# Patient Record
Sex: FEMALE | Race: WHITE | Employment: FULL TIME | URBAN - METROPOLITAN AREA
[De-identification: names, ages, dates, MRNs, and addresses within clinical notes are randomized per-mention and may not be internally consistent; named-entity substitution may affect disease eponyms.]

---

## 2017-10-25 PROCEDURE — 88305 TISSUE EXAM BY PATHOLOGIST: CPT | Performed by: INTERNAL MEDICINE

## 2018-04-04 ENCOUNTER — OFFICE VISIT (OUTPATIENT)
Dept: PHYSICAL THERAPY | Age: 68
End: 2018-04-04
Attending: FAMILY MEDICINE
Payer: MEDICARE

## 2018-04-04 PROCEDURE — 97161 PT EVAL LOW COMPLEX 20 MIN: CPT

## 2018-04-04 PROCEDURE — 97110 THERAPEUTIC EXERCISES: CPT

## 2018-04-04 NOTE — PROGRESS NOTES
UPPER EXTREMITY EVALUATION:   Referring Physician: Kimberly Lazaro MD  Referring Diagnosis: Chronic left shoulder pain (M25.512)    Date of Service: 4/4/2018     PATIENT SUMMARY   Lamberto Bradford is a 79year old female who presents to therapy today with comp FINGER SURGERY   • Other surgical history  10/22/13    left knee a pmm   • Tonsillectomy     • Tubal ligation       Red Flag Questions: No nausea, nystagmus, dysphagia, diplopia, dysarthria, dizziness, drop attack      Physician Appointment: schedule as ne instructed in and issued a HEP for: thoracic extension in sitting, thoracic extension on foam roller  Charges: PT Eval Low Complexity, TherEx x1 (15 min)      Total Timed Treatment: 15 min     Total Treatment Time: 45 min     PLAN OF CARE:    Goals:    · P

## 2018-04-06 ENCOUNTER — OFFICE VISIT (OUTPATIENT)
Dept: PHYSICAL THERAPY | Age: 68
End: 2018-04-06
Attending: FAMILY MEDICINE
Payer: MEDICARE

## 2018-04-06 PROCEDURE — 97140 MANUAL THERAPY 1/> REGIONS: CPT

## 2018-04-06 PROCEDURE — 97110 THERAPEUTIC EXERCISES: CPT

## 2018-04-06 NOTE — PROGRESS NOTES
Dx: Chronic left shoulder pain (M25.512)   Authorized # of Visits: 8 per POC Next MD Visit: As needed   Fall Risk: Standard  Precautions: None      Subjective: Pt states she is feeling good today. The exercises seems to help reduce symptoms.     Objective:

## 2018-04-11 ENCOUNTER — OFFICE VISIT (OUTPATIENT)
Dept: PHYSICAL THERAPY | Age: 68
End: 2018-04-11
Attending: FAMILY MEDICINE
Payer: MEDICARE

## 2018-04-11 PROCEDURE — 97110 THERAPEUTIC EXERCISES: CPT

## 2018-04-11 PROCEDURE — 97140 MANUAL THERAPY 1/> REGIONS: CPT

## 2018-04-11 NOTE — PROGRESS NOTES
Dx: Chronic left shoulder pain (M25.512)   Authorized # of Visits: 8 per POC Next MD Visit: As needed   Fall Risk: Standard  Precautions: None      Subjective: Neck and shoulder have been doing very well.  Had a bad night Saturday, woke up with pain in righ thoracic extension    Charges:  TherEx x2 (30 min), Manual x1 (15 min)       Total Timed Treatment: 45 min  Total Treatment Time: 45 min

## 2018-04-13 ENCOUNTER — OFFICE VISIT (OUTPATIENT)
Dept: PHYSICAL THERAPY | Age: 68
End: 2018-04-13
Attending: FAMILY MEDICINE
Payer: MEDICARE

## 2018-04-13 PROCEDURE — 97110 THERAPEUTIC EXERCISES: CPT

## 2018-04-13 PROCEDURE — 97140 MANUAL THERAPY 1/> REGIONS: CPT

## 2018-04-13 NOTE — PROGRESS NOTES
Dx: Chronic left shoulder pain (M25.512)   Authorized # of Visits: 8 per POC Next MD Visit: As needed   Fall Risk: Standard  Precautions: None      Subjective: Worked yesterday, neck/back were feeling okay. She had headaches that she feels are from sinus. reps Cable row 25# x10 reps (pain); high row 25# 2x15 reps        Cable trunk rotation 25# x15 reps (each direction) Wall lucila 2x10 reps         HEP: thoracic extension in sitting, thoracic extension on foam roller, prone LT, prone thoracic extension, wal

## 2018-04-18 ENCOUNTER — OFFICE VISIT (OUTPATIENT)
Dept: PHYSICAL THERAPY | Age: 68
End: 2018-04-18
Attending: FAMILY MEDICINE
Payer: MEDICARE

## 2018-04-18 PROCEDURE — 97140 MANUAL THERAPY 1/> REGIONS: CPT

## 2018-04-18 PROCEDURE — 97110 THERAPEUTIC EXERCISES: CPT

## 2018-04-18 NOTE — PROGRESS NOTES
Dx: Chronic left shoulder pain (M25.512)   Authorized # of Visits: 8 per POC Next MD Visit: As needed   Fall Risk: Standard  Precautions: None      Subjective: Noticed increased thoracic soreness and discomfort in left shoulder/chest with wall lucila exerci extension x20 reps Seated thoracic extension x20 reps Seated thoracic extension x20 reps Seated thoracic extension x20 reps      Supine thoracic extension on foam roller 2x10 reps Supine thoracic extension on foam roller 2x10 reps Supine thoracic extension

## 2018-04-25 ENCOUNTER — OFFICE VISIT (OUTPATIENT)
Dept: PHYSICAL THERAPY | Age: 68
End: 2018-04-25
Attending: FAMILY MEDICINE
Payer: MEDICARE

## 2018-04-25 PROCEDURE — 97110 THERAPEUTIC EXERCISES: CPT

## 2018-04-25 PROCEDURE — 97140 MANUAL THERAPY 1/> REGIONS: CPT

## 2018-04-25 NOTE — PROGRESS NOTES
Dx: Chronic left shoulder pain (M25.512)   Authorized # of Visits: 8 per POC Next MD Visit: As needed   Fall Risk: Standard  Precautions: None      Subjective: Occasionally feel the left inferior breast pain with superman exercise.  She hasn't had the neck extension with arms 90/90 3x10 reps Prone thoracic extension with arms 90/90 3x10 reps Prone thoracic extension with arms 90/90 3x10 reps     Seated thoracic extension x20 reps Seated thoracic extension x20 reps Seated thoracic extension x20 reps Seated th

## 2018-04-27 ENCOUNTER — OFFICE VISIT (OUTPATIENT)
Dept: PHYSICAL THERAPY | Age: 68
End: 2018-04-27
Attending: FAMILY MEDICINE
Payer: MEDICARE

## 2018-04-27 PROCEDURE — 97140 MANUAL THERAPY 1/> REGIONS: CPT

## 2018-04-27 PROCEDURE — 97110 THERAPEUTIC EXERCISES: CPT

## 2018-04-27 NOTE — PROGRESS NOTES
Dx: Chronic left shoulder pain (M25.512)   Authorized # of Visits: 8 per POC Next MD Visit: As needed   Fall Risk: Standard  Precautions: None       Discharge Summary    Pt has attended 7 visits in Physical Therapy.      Subjective: Pt states she is feeling me at Dept: [phone].     Sincerely,  Electronically signed by therapist: Mario Urrutia PT, DPT, CMT      Date:   4/6/2018  TX#: 2 Date:  4/11/2018  TX#: 3 Date:  4/13/2018  TX#: 4 Date:  4/18/2018  TX#: 5 Date:  4/25/2018  TX#: 6 Date:  4/27/2018  TX#: x10 reps (pain); high row 25# 2x15 reps Cable row 25# x10 reps (pain); high row 25# 2x15 reps  -    Cable trunk rotation 25# x15 reps (each direction) Wall lucila 2x10 reps Standing horizontal ABD, red band, 2x15 rep Standing horizontal ABD, red band, 2x15

## 2020-12-19 ENCOUNTER — IMMUNIZATION (OUTPATIENT)
Dept: LAB | Facility: HOSPITAL | Age: 70
End: 2020-12-19
Attending: PREVENTIVE MEDICINE
Payer: MEDICARE

## 2020-12-19 DIAGNOSIS — Z23 NEED FOR VACCINATION: ICD-10-CM

## 2020-12-19 PROCEDURE — 0001A PFIZER-BIONTECH COVID-19 VACCINE: CPT

## 2021-01-09 ENCOUNTER — IMMUNIZATION (OUTPATIENT)
Dept: LAB | Facility: HOSPITAL | Age: 71
End: 2021-01-09
Attending: PREVENTIVE MEDICINE
Payer: MEDICARE

## 2021-01-09 DIAGNOSIS — Z23 NEED FOR VACCINATION: ICD-10-CM

## 2021-01-09 PROCEDURE — 0002A SARSCOV2 VAC 30MCG/0.3ML IM: CPT

## (undated) NOTE — LETTER
Patient Name: Nandini Parrish  YOB: 1950          MRN number:  [id number]  Date:  4/4/2018  Referring Physician: Stevenson Nuñez MD      UPPER EXTREMITY EVALUATION: Referring Physician: Stevenson Nuñez MD     Referring Diagnosis: Chronic left shoulde Procedure: COLONOSCOPY, POSSIBLE BIOPSY, POSSIBLE POLYPECTOMY 86681;  Surgeon: Artemio Terrazas MD;  Location: [address] [place] [address]   • Orthopedic surg (pbp)      BILATERAL TRIGGER FINGER SURGERY   • Other surgical history  10/22/13    left knee a pmm   • anatomy, intervention rationale, expected course of interventions. 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    Patient was instructed in and issued a HEP for: thoracic extensi I certify the need for these services furnished under this plan of treatment and while under my care.     X___________________________________________________ Date____________________    Certification From: 5/2/4853  To:7/3/2018

## (undated) NOTE — LETTER
Patient Name: Ivana Shah  YOB: 1950          MRN number:  [id number]  Date:  4/27/2018  Referring Physician: Moriah Abraham MD    Discharge Summary  Initial Functional Outcome Score 86/100  Final Functional Outcome Score 86/100  Number of Vi